# Patient Record
Sex: FEMALE | Race: BLACK OR AFRICAN AMERICAN | NOT HISPANIC OR LATINO | Employment: STUDENT | URBAN - METROPOLITAN AREA
[De-identification: names, ages, dates, MRNs, and addresses within clinical notes are randomized per-mention and may not be internally consistent; named-entity substitution may affect disease eponyms.]

---

## 2018-10-19 ENCOUNTER — APPOINTMENT (OUTPATIENT)
Dept: LAB | Facility: HOSPITAL | Age: 28
End: 2018-10-19
Attending: PLASTIC SURGERY

## 2018-10-19 ENCOUNTER — TRANSCRIBE ORDERS (OUTPATIENT)
Dept: LAB | Facility: HOSPITAL | Age: 28
End: 2018-10-19

## 2018-10-19 DIAGNOSIS — Z01.818 ENCOUNTER FOR OTHER PREPROCEDURAL EXAMINATION: ICD-10-CM

## 2018-10-19 DIAGNOSIS — Z01.818 ENCOUNTER FOR OTHER PREPROCEDURAL EXAMINATION: Primary | ICD-10-CM

## 2018-10-19 LAB
ERYTHROCYTE [DISTWIDTH] IN BLOOD BY AUTOMATED COUNT: 11.8 % (ref 11.7–14.4)
HCG SERPL-ACNC: <0.6 MIU/L
HCT VFR BLDCO AUTO: 36.5 % (ref 35–45)
HGB BLD-MCNC: 12 G/DL (ref 11.8–15.7)
MCH RBC QN AUTO: 29.9 PG (ref 28–33.2)
MCHC RBC AUTO-ENTMCNC: 32.9 G/DL (ref 32.2–35.5)
MCV RBC AUTO: 90.8 FL (ref 83–98)
PDW BLD AUTO: 9.9 FL (ref 9.4–12.3)
PLATELET # BLD AUTO: 281 K/UL (ref 150–369)
RBC # BLD AUTO: 4.02 M/UL (ref 3.93–5.22)
WBC # BLD AUTO: 9.81 K/UL (ref 3.8–10.5)

## 2018-10-19 PROCEDURE — 85027 COMPLETE CBC AUTOMATED: CPT

## 2018-10-19 PROCEDURE — 84702 CHORIONIC GONADOTROPIN TEST: CPT

## 2018-10-19 PROCEDURE — 36415 COLL VENOUS BLD VENIPUNCTURE: CPT

## 2018-10-26 ENCOUNTER — ANESTHESIA (OUTPATIENT)
Dept: OPERATING ROOM | Facility: HOSPITAL | Age: 28
Setting detail: HOSPITAL OUTPATIENT SURGERY
End: 2018-10-26

## 2018-10-26 ENCOUNTER — HOSPITAL ENCOUNTER (OUTPATIENT)
Facility: HOSPITAL | Age: 28
Setting detail: HOSPITAL OUTPATIENT SURGERY
Discharge: HOME | End: 2018-10-26
Attending: PLASTIC SURGERY | Admitting: PLASTIC SURGERY

## 2018-10-26 VITALS
OXYGEN SATURATION: 100 % | SYSTOLIC BLOOD PRESSURE: 108 MMHG | TEMPERATURE: 99.9 F | DIASTOLIC BLOOD PRESSURE: 60 MMHG | HEART RATE: 90 BPM | RESPIRATION RATE: 16 BRPM

## 2018-10-26 DIAGNOSIS — N64.82 HYPOMASTIA: Primary | ICD-10-CM

## 2018-10-26 LAB
B-HCG UR QL: NEGATIVE
POCT TEST: NORMAL

## 2018-10-26 PROCEDURE — 71000012 HC PACU PHASE 2 EA ADDL MIN: Performed by: PLASTIC SURGERY

## 2018-10-26 PROCEDURE — 63600000 HC DRUGS/DETAIL CODE: Performed by: NURSE ANESTHETIST, CERTIFIED REGISTERED

## 2018-10-26 PROCEDURE — 36000013 HC OR LEVEL 3 EA ADDL MIN: Performed by: PLASTIC SURGERY

## 2018-10-26 PROCEDURE — 71000002 HC PACU PHASE 2 INITIAL 30MIN: Performed by: PLASTIC SURGERY

## 2018-10-26 PROCEDURE — 63700000 HC SELF-ADMINISTRABLE DRUG: Performed by: PLASTIC SURGERY

## 2018-10-26 PROCEDURE — 71000001 HC PACU PHASE 1 INITIAL 30MIN: Performed by: PLASTIC SURGERY

## 2018-10-26 PROCEDURE — 25000000 HC PHARMACY GENERAL: Performed by: PLASTIC SURGERY

## 2018-10-26 PROCEDURE — 37000001 HC ANESTHESIA GENERAL: Performed by: PLASTIC SURGERY

## 2018-10-26 PROCEDURE — 27200000 HC STERILE SUPPLY: Performed by: PLASTIC SURGERY

## 2018-10-26 PROCEDURE — 25000000 HC PHARMACY GENERAL: Performed by: NURSE ANESTHETIST, CERTIFIED REGISTERED

## 2018-10-26 PROCEDURE — 36000003 HC OR LEVEL 3 INITIAL 30MIN: Performed by: PLASTIC SURGERY

## 2018-10-26 PROCEDURE — 25800000 HC PHARMACY IV SOLUTIONS: Performed by: PLASTIC SURGERY

## 2018-10-26 PROCEDURE — 63700000 HC SELF-ADMINISTRABLE DRUG

## 2018-10-26 PROCEDURE — 63600000 HC DRUGS/DETAIL CODE: Performed by: PLASTIC SURGERY

## 2018-10-26 PROCEDURE — 71000011 HC PACU PHASE 1 EA ADDL MIN: Performed by: PLASTIC SURGERY

## 2018-10-26 PROCEDURE — 0H0V0JZ ALTERATION OF BILATERAL BREAST WITH SYNTHETIC SUBSTITUTE, OPEN APPROACH: ICD-10-PCS | Performed by: PLASTIC SURGERY

## 2018-10-26 DEVICE — IMPLANTABLE DEVICE: Type: IMPLANTABLE DEVICE | Site: BREAST | Status: FUNCTIONAL

## 2018-10-26 RX ORDER — DEXTROSE 50 % IN WATER (D50W) INTRAVENOUS SYRINGE
25 AS NEEDED
Status: DISCONTINUED | OUTPATIENT
Start: 2018-10-26 | End: 2018-10-26

## 2018-10-26 RX ORDER — PROPOFOL 10 MG/ML
INJECTION, EMULSION INTRAVENOUS CONTINUOUS PRN
Status: DISCONTINUED | OUTPATIENT
Start: 2018-10-26 | End: 2018-10-26 | Stop reason: SURG

## 2018-10-26 RX ORDER — DEXTROSE 40 %
15-30 GEL (GRAM) ORAL AS NEEDED
Status: DISCONTINUED | OUTPATIENT
Start: 2018-10-26 | End: 2018-10-26 | Stop reason: HOSPADM

## 2018-10-26 RX ORDER — ROCURONIUM BROMIDE 50 MG/5 ML
SYRINGE (ML) INTRAVENOUS AS NEEDED
Status: DISCONTINUED | OUTPATIENT
Start: 2018-10-26 | End: 2018-10-26 | Stop reason: SURG

## 2018-10-26 RX ORDER — FENTANYL CITRATE 50 UG/ML
INJECTION, SOLUTION INTRAMUSCULAR; INTRAVENOUS AS NEEDED
Status: DISCONTINUED | OUTPATIENT
Start: 2018-10-26 | End: 2018-10-26 | Stop reason: SURG

## 2018-10-26 RX ORDER — FENTANYL CITRATE 50 UG/ML
50 INJECTION, SOLUTION INTRAMUSCULAR; INTRAVENOUS
Status: DISCONTINUED | OUTPATIENT
Start: 2018-10-26 | End: 2018-10-26 | Stop reason: HOSPADM

## 2018-10-26 RX ORDER — DEXTROSE 40 %
15-30 GEL (GRAM) ORAL AS NEEDED
Status: DISCONTINUED | OUTPATIENT
Start: 2018-10-26 | End: 2018-10-26 | Stop reason: SDUPTHER

## 2018-10-26 RX ORDER — IBUPROFEN 200 MG
16-32 TABLET ORAL AS NEEDED
Status: DISCONTINUED | OUTPATIENT
Start: 2018-10-26 | End: 2018-10-26 | Stop reason: HOSPADM

## 2018-10-26 RX ORDER — LIDOCAINE HYDROCHLORIDE AND EPINEPHRINE 10; 10 UG/ML; MG/ML
INJECTION, SOLUTION INFILTRATION; PERINEURAL AS NEEDED
Status: DISCONTINUED | OUTPATIENT
Start: 2018-10-26 | End: 2018-10-26 | Stop reason: HOSPADM

## 2018-10-26 RX ORDER — OXYCODONE AND ACETAMINOPHEN 5; 325 MG/1; MG/1
2 TABLET ORAL EVERY 6 HOURS PRN
Status: DISCONTINUED | OUTPATIENT
Start: 2018-10-26 | End: 2018-10-26 | Stop reason: HOSPADM

## 2018-10-26 RX ORDER — SODIUM CHLORIDE 9 MG/ML
INJECTION, SOLUTION INTRAVENOUS CONTINUOUS
Status: DISCONTINUED | OUTPATIENT
Start: 2018-10-26 | End: 2018-10-26 | Stop reason: HOSPADM

## 2018-10-26 RX ORDER — ONDANSETRON HYDROCHLORIDE 2 MG/ML
INJECTION, SOLUTION INTRAVENOUS AS NEEDED
Status: DISCONTINUED | OUTPATIENT
Start: 2018-10-26 | End: 2018-10-26 | Stop reason: SURG

## 2018-10-26 RX ORDER — HYDROMORPHONE HYDROCHLORIDE 1 MG/ML
INJECTION, SOLUTION INTRAMUSCULAR; INTRAVENOUS; SUBCUTANEOUS AS NEEDED
Status: DISCONTINUED | OUTPATIENT
Start: 2018-10-26 | End: 2018-10-26 | Stop reason: SURG

## 2018-10-26 RX ORDER — IBUPROFEN 200 MG
16-32 TABLET ORAL AS NEEDED
Status: DISCONTINUED | OUTPATIENT
Start: 2018-10-26 | End: 2018-10-26

## 2018-10-26 RX ORDER — BUPIVACAINE HCL/EPINEPHRINE 0.5-1:200K
VIAL (ML) INJECTION AS NEEDED
Status: DISCONTINUED | OUTPATIENT
Start: 2018-10-26 | End: 2018-10-26 | Stop reason: HOSPADM

## 2018-10-26 RX ORDER — MIDAZOLAM HYDROCHLORIDE 2 MG/2ML
INJECTION, SOLUTION INTRAMUSCULAR; INTRAVENOUS AS NEEDED
Status: DISCONTINUED | OUTPATIENT
Start: 2018-10-26 | End: 2018-10-26 | Stop reason: SURG

## 2018-10-26 RX ORDER — IBUPROFEN 200 MG
16-32 TABLET ORAL AS NEEDED
Status: DISCONTINUED | OUTPATIENT
Start: 2018-10-26 | End: 2018-10-26 | Stop reason: SDUPTHER

## 2018-10-26 RX ORDER — LIDOCAINE HYDROCHLORIDE 10 MG/ML
INJECTION, SOLUTION EPIDURAL; INFILTRATION; INTRACAUDAL; PERINEURAL AS NEEDED
Status: DISCONTINUED | OUTPATIENT
Start: 2018-10-26 | End: 2018-10-26 | Stop reason: SURG

## 2018-10-26 RX ORDER — ONDANSETRON HYDROCHLORIDE 2 MG/ML
4 INJECTION, SOLUTION INTRAVENOUS
Status: DISCONTINUED | OUTPATIENT
Start: 2018-10-26 | End: 2018-10-26 | Stop reason: HOSPADM

## 2018-10-26 RX ORDER — PROPOFOL 10 MG/ML
INJECTION, EMULSION INTRAVENOUS AS NEEDED
Status: DISCONTINUED | OUTPATIENT
Start: 2018-10-26 | End: 2018-10-26 | Stop reason: SURG

## 2018-10-26 RX ORDER — CLINDAMYCIN PHOSPHATE 900 MG/50ML
900 INJECTION, SOLUTION INTRAVENOUS
Status: COMPLETED | OUTPATIENT
Start: 2018-10-26 | End: 2018-10-26

## 2018-10-26 RX ORDER — SCOPOLAMINE 1 MG/3D
PATCH, EXTENDED RELEASE TRANSDERMAL
Status: DISCONTINUED
Start: 2018-10-26 | End: 2018-10-26 | Stop reason: HOSPADM

## 2018-10-26 RX ORDER — SCOPOLAMINE 1 MG/3D
1 PATCH, EXTENDED RELEASE TRANSDERMAL ONCE
Status: DISCONTINUED | OUTPATIENT
Start: 2018-10-26 | End: 2018-10-26

## 2018-10-26 RX ORDER — LABETALOL HYDROCHLORIDE 5 MG/ML
5 INJECTION, SOLUTION INTRAVENOUS AS NEEDED
Status: DISCONTINUED | OUTPATIENT
Start: 2018-10-26 | End: 2018-10-26 | Stop reason: HOSPADM

## 2018-10-26 RX ORDER — DEXTROSE 40 %
15-30 GEL (GRAM) ORAL AS NEEDED
Status: DISCONTINUED | OUTPATIENT
Start: 2018-10-26 | End: 2018-10-26

## 2018-10-26 RX ORDER — DEXTROSE 50 % IN WATER (D50W) INTRAVENOUS SYRINGE
25 AS NEEDED
Status: DISCONTINUED | OUTPATIENT
Start: 2018-10-26 | End: 2018-10-26 | Stop reason: SDUPTHER

## 2018-10-26 RX ORDER — DEXAMETHASONE SODIUM PHOSPHATE 4 MG/ML
INJECTION, SOLUTION INTRA-ARTICULAR; INTRALESIONAL; INTRAMUSCULAR; INTRAVENOUS; SOFT TISSUE AS NEEDED
Status: DISCONTINUED | OUTPATIENT
Start: 2018-10-26 | End: 2018-10-26 | Stop reason: SURG

## 2018-10-26 RX ORDER — HYDROMORPHONE HYDROCHLORIDE 1 MG/ML
0.5 INJECTION, SOLUTION INTRAMUSCULAR; INTRAVENOUS; SUBCUTANEOUS
Status: DISCONTINUED | OUTPATIENT
Start: 2018-10-26 | End: 2018-10-26 | Stop reason: HOSPADM

## 2018-10-26 RX ORDER — DEXTROSE 50 % IN WATER (D50W) INTRAVENOUS SYRINGE
25 AS NEEDED
Status: DISCONTINUED | OUTPATIENT
Start: 2018-10-26 | End: 2018-10-26 | Stop reason: HOSPADM

## 2018-10-26 RX ADMIN — HYDROMORPHONE HYDROCHLORIDE 1 MG: 1 INJECTION, SOLUTION INTRAMUSCULAR; INTRAVENOUS; SUBCUTANEOUS at 08:32

## 2018-10-26 RX ADMIN — DEXAMETHASONE SODIUM PHOSPHATE 4 MG: 4 INJECTION, SOLUTION INTRAMUSCULAR; INTRAVENOUS at 08:00

## 2018-10-26 RX ADMIN — PROPOFOL 50 MG: 10 INJECTION, EMULSION INTRAVENOUS at 08:27

## 2018-10-26 RX ADMIN — SCOPOLAMINE 1 PATCH: 1 PATCH, EXTENDED RELEASE TRANSDERMAL at 07:09

## 2018-10-26 RX ADMIN — PROPOFOL 200 MG: 10 INJECTION, EMULSION INTRAVENOUS at 07:37

## 2018-10-26 RX ADMIN — DEXAMETHASONE SODIUM PHOSPHATE 4 MG: 4 INJECTION, SOLUTION INTRAMUSCULAR; INTRAVENOUS at 07:46

## 2018-10-26 RX ADMIN — MIDAZOLAM HYDROCHLORIDE 2 MG: 1 INJECTION, SOLUTION INTRAMUSCULAR; INTRAVENOUS at 07:30

## 2018-10-26 RX ADMIN — PROPOFOL 50 MG: 10 INJECTION, EMULSION INTRAVENOUS at 08:02

## 2018-10-26 RX ADMIN — CLINDAMYCIN IN 5 PERCENT DEXTROSE 900 MG: 18 INJECTION, SOLUTION INTRAVENOUS at 07:28

## 2018-10-26 RX ADMIN — OXYCODONE HYDROCHLORIDE AND ACETAMINOPHEN 1 TABLET: 5; 325 TABLET ORAL at 10:26

## 2018-10-26 RX ADMIN — FENTANYL CITRATE 50 MCG: 50 INJECTION, SOLUTION INTRAMUSCULAR; INTRAVENOUS at 08:03

## 2018-10-26 RX ADMIN — FENTANYL CITRATE 50 MCG: 50 INJECTION, SOLUTION INTRAMUSCULAR; INTRAVENOUS at 08:01

## 2018-10-26 RX ADMIN — FENTANYL CITRATE 150 MCG: 50 INJECTION, SOLUTION INTRAMUSCULAR; INTRAVENOUS at 07:34

## 2018-10-26 RX ADMIN — SODIUM CHLORIDE: 9 INJECTION, SOLUTION INTRAVENOUS at 06:56

## 2018-10-26 RX ADMIN — PROPOFOL 35 MCG/KG/MIN: 10 INJECTION, EMULSION INTRAVENOUS at 07:43

## 2018-10-26 RX ADMIN — ONDANSETRON 4 MG: 2 INJECTION INTRAMUSCULAR; INTRAVENOUS at 07:57

## 2018-10-26 RX ADMIN — SCOPALAMINE 1 PATCH: 1 PATCH, EXTENDED RELEASE TRANSDERMAL at 07:09

## 2018-10-26 RX ADMIN — Medication 10 MG: at 08:28

## 2018-10-26 RX ADMIN — LIDOCAINE HYDROCHLORIDE 5 ML: 10 INJECTION, SOLUTION EPIDURAL; INFILTRATION; INTRACAUDAL; PERINEURAL at 07:37

## 2018-10-26 ASSESSMENT — PAIN - FUNCTIONAL ASSESSMENT: PAIN_FUNCTIONAL_ASSESSMENT: NO/DENIES PAIN

## 2018-10-26 ASSESSMENT — PAIN SCALES - GENERAL: PAIN_LEVEL: 1

## 2018-10-26 NOTE — ANESTHESIA POSTPROCEDURE EVALUATION
Patient: Leia Chris    Procedure Summary     Date:  10/26/18 Room / Location:  [facility] OR 3 / [facility] OR    Anesthesia Start:  0730 Anesthesia Stop:  0923    Procedure:  Breast Augmentation (Bilateral Breast) Diagnosis:       Hypomastia      (Hypomastia Bilatera)    Surgeon:  Mohan Browning MD Responsible Provider:  Liza Gonzalez MD    Anesthesia Type:  general ASA Status:  1          Anesthesia Type: general  PACU Vitals  10/26/2018 0920 - 10/26/2018 1020      10/26/2018 0924 10/26/2018 0930 10/26/2018 0945 10/26/2018 0956    BP: 117/74 114/72 110/67 -    Temp: 36.6 °C (97.8 °F) - - -    Pulse: (!)  117 (!)  115 91 -    Resp: 16 (!)  62 (!)  98 -    SpO2: - - 100 % 100 %              10/26/2018 1000 10/26/2018 1016          BP: 111/66 -      Temp: - -      Pulse: 83 -      Resp: (!)  90 -      SpO2: - 100 %              Anesthesia Post Evaluation    Pain score: 1  Pain management: adequate  Patient location during evaluation: PACU  Patient participation: complete - patient participated  Level of consciousness: awake and alert  Cardiovascular status: acceptable  Airway Patency: adequate  Respiratory status: acceptable  Hydration status: acceptable  Anesthetic complications: no

## 2018-10-26 NOTE — ANESTHESIA PROCEDURE NOTES
Airway  Urgency: elective    Start Time: 10/26/2018 7:39 AM    General Information and Staff    Patient location during procedure: OR  Resident/CRNA: AYDEN KHAN  Performed: resident/CRNA     Indications and Patient Condition  Indications for airway management: anesthesia  Sedation level: deep  Preoxygenated: yes      Final Airway Details  Final airway type: supraglottic airway (LMA)      Successful airway: Size 4    Number of attempts at approach: 1  Number of other approaches attempted: 0  Atraumatic airway insertion

## 2018-10-26 NOTE — OR SURGEON
Pre-Procedure patient identification:  I am the primary operating surgeon/proceduralist and I have identified the patient on 10/26/18 at 7:33 AM Mohan Browning MD  Phone Number: [phone]

## 2018-10-26 NOTE — ANESTHESIA PREPROCEDURE EVALUATION
Anesthesia ROS/MED HX    Anesthesia History    History of anesthetic complications  - PONV  Pulmonary - neg  Neuro/Psych - neg  Cardiovascular- neg  GI/Hepatic- neg  Musculoskeletal- neg  Endo/Other- neg  Body Habitus: Normal    HCG negative  Past Surgical History:   Procedure Laterality Date   • KNEE ARTHROSCOPY Right    • WISDOM TOOTH EXTRACTION      x4       Physical Exam    Airway   Mallampati: II   TM distance: >3 FB   Neck ROM: full  Cardiovascular - normal   Rhythm: regular   Rate: normal  Pulmonary - normal   clear to auscultation  Dental - normal        Anesthesia Plan    Plan: general    Technique: general LMA     Lines and Monitors: PIV     Airway: oral intubation   ASA 1  Blood Products:   Use of Blood Products Discussed: No   Anesthetic plan and risks discussed with: patient  Induction:    intravenous   Postop Plan:   Patient Disposition: phase II then home   Pain Management: IV analgesics

## 2018-10-29 NOTE — OP NOTE
Preop diagnosis: 1. Hypomastia desire for increased breast volume 2 Tuberous Breasts Bilaterally  Postop diagnosis:1. Hypomastia desire for increased breast volume 2 Tuberous Breasts Bilaterally    Procedure: Bilateral augmentation mammoplasty in subpectoral plane via IMF fold incision with silicone implants  Surgeon Mohan Browning MD  Anesthesia: general  Findings excellent Breast size and shape symmetry both seated and supine at  conclusion of surgery  Left implant: Collins Smooth round high Profile 375cc Memory Gel  Right implant:Collins Smooth round high Profile 375cc Memory gel      Description of operative procedure:  Patient is a 28 year-old female who presented to the office to discuss what can be done to increase the volume of her breasts she is displeased with the disproportionate size of her breast compared to the rest of her body and desires increased volume of the breast.  Discussed the options for increasing breast volume including implants and fat transfer.  We discussed the risks benefits and alternatives to each, including but not limited to the risks of bleeding, infection, seroma, hematoma, asymmetry, change in nipple or breast sensation including numbness which may be permanent, FDA recommendations for screening of silicone implants, risk of rupture, risk of capsular contracture, breast implant associated ALCL, the fact the implants are not a lifelong device will need to be replaced at some point.  Patient was given opportunity to ask questions and she indicated that they were answered to her satisfaction.  Patient indicated she understood and wished to proceed with surgery.  The mentor sizing system was used to determine the size implant the patient desired. No promises made or guarantees given as to the results of surgery or the risks of complications.   I reviewed and signed the informed consent with the patient.  The morning of surgery, patient was met in the preoperative holding area.  The  patient was identified per protocol informed consent was again reviewed with patient regards to risks benefits and alternatives to the procedure.  Patient indicated she understood and wished to proceed.  Preoperative markings were performed which included the midline the chest the inframammary fold bilaterally the superior medial and lateral extent of pocket dissection as well as the inframammary fold incision.  Implant size was confirmed with the patient.  Patient was taken back to the operating room and positioned supine on the operating room table.  All pressure points were padded.  IV antibiotics were given.  Bilateral SCDs were placed and functioning.  Prior to the induction of anesthesia, anesthesia was induced by the anesthesia staff.  Patient was prepped and draped in normal sterile fashion.  Timeout procedure was performed according to protocol.  10 cc of 1% lidocaine with epinephrine was injected into the inframammary fold incision as well as the inferior insertion to the pectoralis major muscle on each breast.  Attention was first turned to the right breast, the inframammary fold incision was made with a 15 blade.  Dissection was carried down to the inferior insertions of the pectoralis major muscle using Bovie electrocautery under direct visualization. The inferior insertions of the pectoralis major muscle were divided allowing entry into the loose areolar subpectoral plane.  Under direct visualization using Bovie electrocautery and a lighted retractor the sub-pectoral pocket was dissected to the preoperatively marked superior medial and lateral extent of pocket dissection.  Pocket was checked for hemostasis which was achieved when necessary using Bovie electrocautery.  Pocket was then irrigated with triple antibiotic solution.  Pocket was then packed with 5 Ray-Dary sponges.    Attention was then turned to the left breast where in the same fashion the inframammary fold incision was made and the  dissection performed as was done the right side.  The left side was also packed with five Raytec sponges.      Attention was turned back to the right breast.  The 5 Raytek sponges were removed and accounted for, the pocket was checked for hemostasis which was felt to be good.  The pocket was again irrigated with triple antibiotic solution, then instilled with 10 cc of half percent Marcaine with epi.  The Mariee funnel was then opened and prepped according to 's instructions including irrigation with triple antibiotic solution.  The breast implant was open and irrigated with triple antibiotic solution.  Gloves were changed and the area around the incision wiped with triple anabolic solution.  Using a no touch technique the breast implant was transferred to the Mariee funnel, and then using the Mariee funnel inserted into the right breast.  Minor adjustments were made to achieve the best possible size and shape of the breast after patient was evaluated in both seated and supine position.  Attention was turned to the left breast where the 5 Ray-Dary sponges were removed and accounted for.  Pocket checked for hemostasis which when necessary was performed using Bovie electrocautery.  Hemostasis was good.  Pocket was instilled with 10 cc of half percent Marcaine with epinephrine.  In the same fashion as performed on the right side using a no touch technique after glove change the left-sided implant was placed.  Patient was evaluated in the seated and supine position for size and shape of the left and right breasts as well as symmetry minor adjustments were made to achieve the best possible size and shape symmetry between the breasts.    Incisions were closed with a deep layer of interrupted 2-0 Vicryl suture repairing the superficial fascial layer at the level of the inframammary fold, then a deep dermal layer of 2-0 Vicryl and finally a running 3-0 Monocryl subcuticular suture.  Steri-Strips applied to the  incisions then Xeroform strips and 2 x 2's and paper tape and a surgical bra.  All sponge and needle counts were correct at conclusion of the procedure.  Patient was awakened from general anesthesia in stable condition.  Debrief procedure performed according to protocol.  Patient transported to recovery room.    Estimated blood loss: Less than 30 cc  Wound classification: Class I clean  Complications: None  Disposition: PACU then home with family.

## 2018-12-27 ENCOUNTER — HOSPITAL ENCOUNTER (EMERGENCY)
Facility: HOSPITAL | Age: 28
Discharge: HOME | End: 2018-12-27
Attending: EMERGENCY MEDICINE
Payer: MEDICARE

## 2018-12-27 ENCOUNTER — APPOINTMENT (EMERGENCY)
Dept: RADIOLOGY | Facility: HOSPITAL | Age: 28
End: 2018-12-27
Payer: MEDICARE

## 2018-12-27 VITALS
DIASTOLIC BLOOD PRESSURE: 80 MMHG | OXYGEN SATURATION: 98 % | SYSTOLIC BLOOD PRESSURE: 140 MMHG | TEMPERATURE: 97.8 F | RESPIRATION RATE: 16 BRPM | HEART RATE: 101 BPM

## 2018-12-27 DIAGNOSIS — S83.91XA RIGHT KNEE SPRAIN: Primary | ICD-10-CM

## 2018-12-27 PROCEDURE — 73564 X-RAY EXAM KNEE 4 OR MORE: CPT

## 2018-12-27 PROCEDURE — 99283 EMERGENCY DEPT VISIT LOW MDM: CPT

## 2018-12-28 NOTE — ED PROVIDER NOTES
History  Chief Complaint   Patient presents with    Knee Injury     right knee pain after falling while skiing     32-year-old female here for right knee injury that occurred while skiing  States her ski got stuck in the snow as she was turning  Since then has not been able to bear weight  She finished her run 1 ski  She has prior meniscal tear in this right knee and feels similar  Denies any swelling  No other injuries reported  No head injury no loss of consciousness  He        History provided by:  Patient   used: No    Knee Pain   Location:  Knee  Time since incident:  1 hour  Injury: yes    Mechanism of injury: fall    Fall:     Fall occurred:  Skiing/snowboarding    Impact surface:  Ice and snow    Point of impact:  Unable to specify    Entrapped after fall: no    Knee location:  R knee  Pain details:     Quality:  Aching    Radiates to:  Does not radiate    Severity:  Moderate    Onset quality:  Sudden    Duration:  1 hour    Timing:  Constant    Progression:  Unchanged  Chronicity:  New  Dislocation: no    Foreign body present:  No foreign bodies  Tetanus status:  Up to date  Prior injury to area:  Yes  Relieved by:  Rest  Worsened by:  Bearing weight, extension and flexion  Ineffective treatments:  None tried  Associated symptoms: decreased ROM    Associated symptoms: no back pain, no fatigue, no fever, no itching, no muscle weakness, no neck pain, no numbness, no stiffness, no swelling and no tingling    Risk factors: no concern for non-accidental trauma, no frequent fractures, no known bone disorder, no obesity and no recent illness        None       History reviewed  No pertinent past medical history  Past Surgical History:   Procedure Laterality Date    KNEE SURGERY Right        History reviewed  No pertinent family history  I have reviewed and agree with the history as documented      Social History   Substance Use Topics    Smoking status: Never Smoker    Smokeless tobacco: Never Used    Alcohol use No        Review of Systems   Constitutional: Negative for activity change, appetite change, chills, diaphoresis, fatigue, fever and unexpected weight change  HENT: Negative for congestion, rhinorrhea, sinus pressure, sore throat and trouble swallowing  Eyes: Negative for photophobia and visual disturbance  Respiratory: Negative for apnea, cough, choking, chest tightness, shortness of breath, wheezing and stridor  Cardiovascular: Negative for chest pain, palpitations and leg swelling  Gastrointestinal: Negative for abdominal distention, abdominal pain, blood in stool, constipation, diarrhea, nausea and vomiting  Genitourinary: Negative for decreased urine volume, difficulty urinating, dysuria, enuresis, flank pain, frequency, hematuria and urgency  Musculoskeletal: Negative for arthralgias, back pain, myalgias, neck pain, neck stiffness and stiffness  Skin: Negative for color change, itching, pallor, rash and wound  Allergic/Immunologic: Negative  Neurological: Negative for dizziness, tremors, syncope, weakness, light-headedness, numbness and headaches  Hematological: Negative  Psychiatric/Behavioral: Negative  All other systems reviewed and are negative  Physical Exam  Physical Exam   Constitutional: She is oriented to person, place, and time  She appears well-developed and well-nourished  Non-toxic appearance  She does not have a sickly appearance  She does not appear ill  No distress  HENT:   Head: Normocephalic and atraumatic  Eyes: Pupils are equal, round, and reactive to light  EOM and lids are normal    Neck: Normal range of motion  Neck supple  Cardiovascular: Normal rate, regular rhythm, S1 normal, S2 normal, normal heart sounds, intact distal pulses and normal pulses  Exam reveals no gallop, no distant heart sounds, no friction rub and no decreased pulses  No murmur heard    Pulses:       Radial pulses are 2+ on the right side, and 2+ on the left side  Pulmonary/Chest: Effort normal and breath sounds normal  No accessory muscle usage  No apnea, no tachypnea and no bradypnea  No respiratory distress  She has no decreased breath sounds  She has no wheezes  She has no rhonchi  She has no rales  Abdominal: Soft  Normal appearance  She exhibits no distension  There is no tenderness  There is no rigidity, no rebound and no guarding  Musculoskeletal: She exhibits no edema or deformity  Right hip: Normal         Right knee: She exhibits decreased range of motion and bony tenderness  She exhibits no swelling, no effusion, no ecchymosis and no laceration  Tenderness (generalized) found  Right ankle: Normal    No obvious joint laxity of right knee on exam     Neurological: She is alert and oriented to person, place, and time  No cranial nerve deficit  GCS eye subscore is 4  GCS verbal subscore is 5  GCS motor subscore is 6  Skin: Skin is warm, dry and intact  No rash noted  She is not diaphoretic  No erythema  No pallor  Psychiatric: Her speech is normal    Nursing note and vitals reviewed  Vital Signs  ED Triage Vitals [12/27/18 1745]   Temperature Pulse Respirations Blood Pressure SpO2   97 8 °F (36 6 °C) 101 16 140/80 98 %      Temp Source Heart Rate Source Patient Position - Orthostatic VS BP Location FiO2 (%)   Oral Monitor Sitting Left arm --      Pain Score       6           Vitals:    12/27/18 1745   BP: 140/80   Pulse: 101   Patient Position - Orthostatic VS: Sitting       Visual Acuity      ED Medications  Medications - No data to display    Diagnostic Studies  Results Reviewed     None                 XR knee 4+ vw right injury   ED Interpretation by Rachana Aguilar PA-C (12/27 1931)   No acute osseous abnormalities      Final Result by Elio Cody MD (12/28 9811)      No acute osseous abnormality              Workstation performed: [id number]                    Procedures  Procedures       Phone Contacts  ED Phone Contact    ED Course                               MDM  Number of Diagnoses or Management Options  Right knee sprain: new and requires workup  Diagnosis management comments: Differential diagnosis including but not limited to: sprain, strain, fracture, dislocation, contusion  Plan: XR and analgesia  dispo pending  Amount and/or Complexity of Data Reviewed  Tests in the radiology section of CPT®: ordered and reviewed  Independent visualization of images, tracings, or specimens: yes    Risk of Complications, Morbidity, and/or Mortality  Presenting problems: low  Management options: low  General comments: 33 y/o female with knee injury  X-ray shows no acute osseous abnormalities  Given her sensation of joint laxity and difficulty bearing weight will give knee immobilizer and crutches and have her follow up with her orthopedic  We discussed Tylenol and Motrin for pain  We discussed return parameters  Patient understands and agrees with the plan  Patient Progress  Patient progress: stable    CritCare Time    Disposition  Final diagnoses:   Right knee sprain     Time reflects when diagnosis was documented in both MDM as applicable and the Disposition within this note     Time User Action Codes Description Comment    12/27/2018  8:09 PM Rene Nicholson Add [[id number]] Right knee sprain       ED Disposition     ED Disposition Condition Comment    [facility]charge  [facility]      Condition at discharge: Good        Follow-up Information     Follow up With Specialties Details Why Contact Info Additional [address] [facility] [place] [facility] Call if unable to see your orthopedic [address] [facility]  Diego Gomez [address]  [address] [facility] [place], [address] [address] [place], [place], [phone]          There are no discharge medications for this patient  No discharge procedures on file      ED Provider  Electronically Signed by           Chiara Reeder PA-C  01/01/19 4916

## 2018-12-28 NOTE — DISCHARGE INSTRUCTIONS
Return to the Emergency Department sooner if increased pain, swelling, numbness, weakness, vomiting, difficulty breathing, redness  Ice, elevate  Knee Sprain   WHAT YOU NEED TO KNOW:   What is a knee sprain? A knee sprain occurs when one or more ligaments in your knee are suddenly stretched or torn  Ligaments are tissues that hold bones together  Ligaments support the knee and keep the joint and bones in the correct position  What causes a knee sprain? · A sudden twisting of the knee joint  may cause a knee sprain  This may happen when you run, jump and land, or stop or change direction suddenly  Activities that cause your knee to extend more than normal can also cause a sprain  Sprains commonly occur in sports such as football, basketball, hockey, and skiing  · Direct hits to the knee  may cause a sprain  Sprains may be caused by hits to the front, sides, or back of the knees  Sprains may be caused by falling onto your knees while they are bent  A sprain may also happen during a car accident  What increases my risk for a knee sprain? · Lack of the correct shoes or protective gear     · No warm up or stretching before exercise    · Excessive exercise or a sudden increase in exercise     · A previous sprain  What are the signs and symptoms of a knee sprain? · Stiffness or decreased movement     · Pain or tenderness     · Painful pop that you can hear or feel    · Swelling or bruising    · Knee that martin or gives out when you try to walk  How is a knee sprain diagnosed? Your healthcare provider will ask you about your injury and symptoms, and examine you  Tell him or her if you heard a snap or pop when you were injured  Your healthcare provider will check the movement and strength of your knee joint  An x-ray, CT scan or MRI may show the sprain or other damage to your knee  You may be given contrast liquid to help your injury show up better in the pictures   Tell the healthcare provider if you have ever had an allergic reaction to contrast liquid  Do not enter the MRI room with anything metal  Metal can cause serious injury  Tell the healthcare provider if you have any metal in or on your body  How is a knee sprain treated? Treatment depends on the type and cause of your knee sprain  You may need any of the following:  · NSAIDs , such as ibuprofen, help decrease swelling, pain, and fever  This medicine is available with or without a doctor's order  NSAIDs can cause stomach bleeding or kidney problems in certain people  If you take blood thinner medicine, always ask your healthcare provider if NSAIDs are safe for you  Always read the medicine label and follow directions  · Acetaminophen  decreases pain and fever  It is available without a doctor's order  Ask how much to take and how often to take it  Follow directions  Read the labels of all other medicines you are using to see if they also contain acetaminophen, or ask your doctor or pharmacist  Acetaminophen can cause liver damage if not taken correctly  Do not use more than 4 grams (4,000 milligrams) total of acetaminophen in one day  · Prescription pain medicine  may be given  Ask how to take this medicine safely  · Support devices  such as a splint or brace may be needed  These devices limit movement and protect your joint while it heals  You may be given crutches to use until you can stand on your injured leg without pain  Use devices as directed  · Physical therapy  may be needed  A physical therapist teaches you exercises to help improve movement and strength, and to decrease pain  · Surgery  may be needed if other treatments do not work or your strain is severe  Surgery may include a knee arthroscopy to look inside your knee joint and repair damage  How can I manage my knee sprain? · Rest  your knee and do not exercise  You may be told to keep weight off your knee   This means that you should not walk on your injured leg  Rest helps decrease swelling and allows the injury to heal  You can do gentle range of motion (ROM) exercises as directed  This will prevent stiffness  · Apply ice  on your knee for 15 to 20 minutes every hour or as directed  Use an ice pack, or put crushed ice in a plastic bag  Cover it with a towel  Ice helps prevent tissue damage and decreases swelling and pain  · Apply compression to your knee as directed  You may need to wear an elastic bandage  This helps keep your injured knee from moving too much while it heals  You can loosen or tighten the elastic bandage to make it comfortable  It should be tight enough for you to feel support  It should not be so tight that it causes your toes to feel numb or tingly  If you are wearing an elastic bandage, take it off and rewrap it once a day  · Elevate your knee  above the level of your heart as often as you can  This will help decrease swelling and pain  Prop your leg on pillows or blankets to keep it elevated comfortably  Do not put pillows directly behind your knee  How can I prevent another knee sprain? Exercise your legs to keep your muscles strong  Strong leg muscles help protect your knee and prevent strain  The following may also prevent a knee sprain:  · Slowly start your exercise or training program   Slowly increase the time, distance, and intensity of your exercise  Sudden increases in training may cause you to injure your knee again  · Wear protective braces and equipment as directed  Braces may prevent your knee from moving the wrong way and causing another sprain  Protective equipment may support your bones and ligaments to prevent injury  · Warm up and stretch before exercise  Warm up by walking or using an exercise bike before starting your regular exercise  Do gentle stretches after warming up  This helps to loosen your muscles and decrease stress on your knee  Cool down and stretch after you exercise       · Wear shoes that fit correctly and support your feet  Replace your running or exercise shoes before the padding or shock absorption is worn out  Ask your healthcare provider which exercise shoes are best for you  Ask if you should wear special shoe inserts  Shoe inserts can help support your heels and arches or keep your foot lined up correctly in your shoes  Exercise on flat surfaces  When should I seek care immediately? · Any part of your leg feels cold, numb, or looks pale     When should I contact my healthcare provider? · You have new or increased swelling, bruising, or pain in your knee  · Your symptoms do not improve within 6 weeks, even with treatment  · You have questions or concerns about your condition or care  CARE AGREEMENT:   You have the right to help plan your care  Learn about your health condition and how it may be treated  Discuss treatment options with your caregivers to decide what care you want to receive  You always have the right to refuse treatment  The above information is an  only  It is not intended as medical advice for individual conditions or treatments  Talk to your doctor, nurse or pharmacist before following any medical regimen to see if it is safe and effective for you  © 2017 [facility] Information is for End User's use only and may not be sold, redistributed or otherwise used for commercial purposes  All illustrations and images included in CareNotes® are the copyrighted property of [employer]  or [employer]

## (undated) DEVICE — GLOVE SZ 7.5 LINER PROTEXIS PI BL

## (undated) DEVICE — Device

## (undated) DEVICE — DRESSING TEGADERM 2 3/8 X 2 3/4

## (undated) DEVICE — SYRINGE BULB 60CC

## (undated) DEVICE — COVER MAYO STAND

## (undated) DEVICE — GAUZE XEROFORM 1X8 NON-STERILE PACKET

## (undated) DEVICE — STERISTRIP 1/2INX4IN

## (undated) DEVICE — SPONGE RAYTEC 8X4 12 PLY

## (undated) DEVICE — SYRINGE DISP LUER-LOK 10 CC

## (undated) DEVICE — PENCIL ESU HANDSWITCHING W/HOL

## (undated) DEVICE — SOLN IRRIG .9%SOD 500ML

## (undated) DEVICE — GLOVE SZ 7.5 PROTEXIS PI

## (undated) DEVICE — IMPLANT FUNNEL BREAST DEPLOYMENT

## (undated) DEVICE — PACK MLHS MINOR PROCEDURE

## (undated) DEVICE — SUTURE VICRYL 2-0 J417H SH UNDYED

## (undated) DEVICE — TIP BOVIE BLADE COATED INSULATED 2.75IN

## (undated) DEVICE — SOLN IV 0.9% NSS 1000ML

## (undated) DEVICE — DRAPE CHEST STERILE

## (undated) DEVICE — STAPLER SKIN

## (undated) DEVICE — COVER LIGHTHANDLE (STERILE SINGLE PA

## (undated) DEVICE — APPLICATOR CHLORAPREP 26ML ORANGE TINT

## (undated) DEVICE — SPONGE GAUZE 2X2 4 PLY-2FTS

## (undated) DEVICE — SUTURE MONOCRYL 3-0  Y497G

## (undated) DEVICE — CANISTER KIT 1500CC WITH 6FT TUBING SEMI-RIGID SAFELINER

## (undated) DEVICE — ELECTROSURGICAL TIP CLEANER

## (undated) DEVICE — ***USE 57698*** SLEEVE FLOWTRON DVT CALF SINGLE USE

## (undated) DEVICE — TIP SUCTION YANKAUER

## (undated) DEVICE — PAD GROUND ELECTROSURGICAL W/CORD

## (undated) DEVICE — BLANKET LOWER BODY

## (undated) DEVICE — TIP BOVIE BLADE COATED INSULATED 6IN

## (undated) DEVICE — TUBE SUCTION 1/4INX20FT STERILE